# Patient Record
Sex: FEMALE | Race: WHITE | NOT HISPANIC OR LATINO | Employment: OTHER | URBAN - NONMETROPOLITAN AREA
[De-identification: names, ages, dates, MRNs, and addresses within clinical notes are randomized per-mention and may not be internally consistent; named-entity substitution may affect disease eponyms.]

---

## 2024-03-13 PROBLEM — E66.01 CLASS 2 SEVERE OBESITY WITH SERIOUS COMORBIDITY IN ADULT (MULTI): Status: ACTIVE | Noted: 2024-03-13

## 2024-03-13 PROBLEM — R00.2 PALPITATIONS: Status: ACTIVE | Noted: 2024-03-13

## 2024-03-13 PROBLEM — Z98.61 HISTORY OF PTCA: Status: ACTIVE | Noted: 2024-03-13

## 2024-03-13 PROBLEM — I25.10 ATHEROSCLEROSIS OF CORONARY ARTERY OF NATIVE HEART WITHOUT ANGINA PECTORIS: Status: ACTIVE | Noted: 2024-03-13

## 2024-03-13 PROBLEM — E66.812 CLASS 2 SEVERE OBESITY WITH SERIOUS COMORBIDITY IN ADULT: Status: ACTIVE | Noted: 2024-03-13

## 2024-03-13 PROBLEM — Z87.891 FORMER SMOKER: Status: ACTIVE | Noted: 2024-03-13

## 2024-03-13 RX ORDER — AMLODIPINE BESYLATE 10 MG/1
1 TABLET ORAL DAILY
COMMUNITY

## 2024-03-13 RX ORDER — ATORVASTATIN CALCIUM 40 MG/1
1 TABLET, FILM COATED ORAL NIGHTLY
COMMUNITY

## 2024-03-13 RX ORDER — ASPIRIN 81 MG/1
1 TABLET ORAL DAILY
COMMUNITY
Start: 2022-05-19

## 2024-03-13 RX ORDER — ESCITALOPRAM OXALATE 5 MG/1
1 TABLET ORAL DAILY
COMMUNITY

## 2024-03-13 RX ORDER — CALCIUM CARBONATE/VITAMIN D3 500-10/5ML
1 LIQUID (ML) ORAL DAILY
COMMUNITY
End: 2024-06-11 | Stop reason: ALTCHOICE

## 2024-03-13 RX ORDER — LEVOTHYROXINE SODIUM 88 UG/1
1 TABLET ORAL DAILY
COMMUNITY

## 2024-06-11 ENCOUNTER — OFFICE VISIT (OUTPATIENT)
Dept: CARDIOLOGY | Facility: CLINIC | Age: 69
End: 2024-06-11
Payer: MEDICARE

## 2024-06-11 VITALS
HEART RATE: 68 BPM | WEIGHT: 224.4 LBS | BODY MASS INDEX: 38.31 KG/M2 | SYSTOLIC BLOOD PRESSURE: 132 MMHG | HEIGHT: 64 IN | DIASTOLIC BLOOD PRESSURE: 82 MMHG

## 2024-06-11 DIAGNOSIS — I25.10 ATHEROSCLEROSIS OF NATIVE CORONARY ARTERY OF NATIVE HEART WITHOUT ANGINA PECTORIS: ICD-10-CM

## 2024-06-11 DIAGNOSIS — Z98.61 HISTORY OF PTCA: ICD-10-CM

## 2024-06-11 DIAGNOSIS — Z87.891 FORMER SMOKER: ICD-10-CM

## 2024-06-11 DIAGNOSIS — I10 ESSENTIAL HYPERTENSION: ICD-10-CM

## 2024-06-11 PROBLEM — E66.812 CLASS 2 SEVERE OBESITY WITH SERIOUS COMORBIDITY IN ADULT: Status: RESOLVED | Noted: 2024-03-13 | Resolved: 2024-06-11

## 2024-06-11 PROBLEM — E66.01 CLASS 2 SEVERE OBESITY WITH SERIOUS COMORBIDITY IN ADULT (MULTI): Status: RESOLVED | Noted: 2024-03-13 | Resolved: 2024-06-11

## 2024-06-11 PROCEDURE — 1036F TOBACCO NON-USER: CPT | Performed by: INTERNAL MEDICINE

## 2024-06-11 PROCEDURE — 3079F DIAST BP 80-89 MM HG: CPT | Performed by: INTERNAL MEDICINE

## 2024-06-11 PROCEDURE — 1159F MED LIST DOCD IN RCRD: CPT | Performed by: INTERNAL MEDICINE

## 2024-06-11 PROCEDURE — 99213 OFFICE O/P EST LOW 20 MIN: CPT | Performed by: INTERNAL MEDICINE

## 2024-06-11 PROCEDURE — 3075F SYST BP GE 130 - 139MM HG: CPT | Performed by: INTERNAL MEDICINE

## 2024-06-11 PROCEDURE — 3008F BODY MASS INDEX DOCD: CPT | Performed by: INTERNAL MEDICINE

## 2024-06-11 PROCEDURE — 1160F RVW MEDS BY RX/DR IN RCRD: CPT | Performed by: INTERNAL MEDICINE

## 2024-06-11 NOTE — LETTER
"June 11, 2024     Sanjuana Vega MD  [address] [facility] [address]    Patient: Jan Behrendsen   YOB: 1955   Date of Visit: 6/11/2024       Dear Dr. Sanjuana Vega MD:    Thank you for referring Jan Behrendsen to me for evaluation. Below are my notes for this consultation.  If you have questions, please do not hesitate to call me. I look forward to following your patient along with you.       Sincerely,     Leonardo Patino, DO      CC: No Recipients  ______________________________________________________________________________________    Subjective   Jan Behrendsen is a 69 y.o. female       Chief Complaint    Annual Exam          69-year-old female returns for follow-up she is doing very well, denies any cardiovascular events, complaints or nitrate usage or hospitalizations.  She is still walking 1-1/2 miles daily.  She has not had any substantial improvement in her obesity but remains aerobically active and asymptomatic and healthy.  She had normal stress testing in 2023, details of which have been reviewed.     She has known coronary disease with previous coronary intervention of the LAD with 1 drug-eluting stent in the RCA with 3 drug-eluting stents dating back to 2010. She is followed with Dr. Obando up until his recent intermediate    Her palpitations have resolved completely over the past year, last echo monitor was normal sinus rhythm without arrhythmia    Recommendations, continue current activity level and exercise and aerobic activity and dietary discretion, will follow-up in 1 year         Review of Systems   All other systems reviewed and are negative.           Vitals:    06/11/24 1017   BP: 132/82   BP Location: Right arm   Patient Position: Sitting   Pulse: 68   Weight: 102 kg (224 lb 6.4 oz)   Height: 1.626 m (5' 4\")        Objective   Physical Exam  Constitutional:       Appearance: Normal appearance.   HENT:      Nose: Nose normal. "   Neck:      Vascular: No carotid bruit.   Cardiovascular:      Rate and Rhythm: Normal rate.      Pulses: Normal pulses.      Heart sounds: Normal heart sounds.   Pulmonary:      Effort: Pulmonary effort is normal.   Abdominal:      General: Bowel sounds are normal.      Palpations: Abdomen is soft.   Musculoskeletal:         General: Normal range of motion.      Cervical back: Normal range of motion.      Right lower leg: No edema.      Left lower leg: No edema.   Skin:     General: Skin is warm and dry.   Neurological:      General: No focal deficit present.      Mental Status: She is alert.   Psychiatric:         Mood and Affect: Mood normal.         Behavior: Behavior normal.         Thought Content: Thought content normal.         Judgment: Judgment normal.         Allergies  Penicillins     Current Medications    Current Outpatient Medications:   •  amLODIPine (Norvasc) 10 mg tablet, Take 1 tablet (10 mg) by mouth once daily., Disp: , Rfl:   •  aspirin 81 mg EC tablet, Take 1 tablet (81 mg) by mouth once daily., Disp: , Rfl:   •  atorvastatin (Lipitor) 40 mg tablet, Take 1 tablet (40 mg) by mouth once daily at bedtime., Disp: , Rfl:   •  escitalopram (Lexapro) 5 mg tablet, Take 1 tablet (5 mg) by mouth once daily., Disp: , Rfl:   •  levothyroxine (Synthroid, Levoxyl) 88 mcg tablet, Take 1 tablet (88 mcg) by mouth once daily., Disp: , Rfl:                      Assessment/Plan   1. Atherosclerosis of native coronary artery of native heart without angina pectoris        2. History of PTCA        3. BMI 38.0-38.9,adult        4. Former smoker                 Scribe Attestation  By signing my name below, INataly LPN, Scribe   attest that this documentation has been prepared under the direction and in the presence of Leonardo Patino DO.     Provider Attestation - Scribe documentation    All medical record entries made by the Scribe were at my direction and personally dictated by me. I have reviewed the  chart and agree that the record accurately reflects my personal performance of the history, physical exam, discussion and plan.

## 2024-06-13 DIAGNOSIS — I10 ESSENTIAL HYPERTENSION: ICD-10-CM

## 2024-06-14 RX ORDER — AMLODIPINE BESYLATE 10 MG/1
10 TABLET ORAL DAILY
Qty: 90 TABLET | Refills: 3 | Status: SHIPPED | OUTPATIENT
Start: 2024-06-14

## 2024-07-08 DIAGNOSIS — I25.10 ATHEROSCLEROSIS OF NATIVE CORONARY ARTERY OF NATIVE HEART WITHOUT ANGINA PECTORIS: ICD-10-CM

## 2024-07-11 RX ORDER — ATORVASTATIN CALCIUM 40 MG/1
40 TABLET, FILM COATED ORAL NIGHTLY
Qty: 90 TABLET | Refills: 3 | Status: SHIPPED | OUTPATIENT
Start: 2024-07-11 | End: 2025-07-11

## 2025-04-13 DIAGNOSIS — I10 ESSENTIAL HYPERTENSION: ICD-10-CM

## 2025-04-15 RX ORDER — AMLODIPINE BESYLATE 10 MG/1
10 TABLET ORAL DAILY
Qty: 90 TABLET | Refills: 0 | Status: SHIPPED | OUTPATIENT
Start: 2025-04-15

## 2025-06-12 ENCOUNTER — APPOINTMENT (OUTPATIENT)
Dept: CARDIOLOGY | Facility: CLINIC | Age: 70
End: 2025-06-12
Payer: MEDICARE

## 2025-06-12 VITALS
WEIGHT: 223.4 LBS | SYSTOLIC BLOOD PRESSURE: 110 MMHG | DIASTOLIC BLOOD PRESSURE: 70 MMHG | HEIGHT: 64 IN | HEART RATE: 68 BPM | BODY MASS INDEX: 38.14 KG/M2

## 2025-06-12 DIAGNOSIS — E78.2 MIXED HYPERLIPIDEMIA: ICD-10-CM

## 2025-06-12 DIAGNOSIS — I25.10 ATHEROSCLEROSIS OF NATIVE CORONARY ARTERY OF NATIVE HEART WITHOUT ANGINA PECTORIS: ICD-10-CM

## 2025-06-12 DIAGNOSIS — I10 ESSENTIAL HYPERTENSION: ICD-10-CM

## 2025-06-12 DIAGNOSIS — Z87.891 FORMER SMOKER: ICD-10-CM

## 2025-06-12 DIAGNOSIS — Z98.61 HISTORY OF PTCA: ICD-10-CM

## 2025-06-12 PROCEDURE — 1036F TOBACCO NON-USER: CPT | Performed by: INTERNAL MEDICINE

## 2025-06-12 PROCEDURE — 3008F BODY MASS INDEX DOCD: CPT | Performed by: INTERNAL MEDICINE

## 2025-06-12 PROCEDURE — 3078F DIAST BP <80 MM HG: CPT | Performed by: INTERNAL MEDICINE

## 2025-06-12 PROCEDURE — 1160F RVW MEDS BY RX/DR IN RCRD: CPT | Performed by: INTERNAL MEDICINE

## 2025-06-12 PROCEDURE — 1159F MED LIST DOCD IN RCRD: CPT | Performed by: INTERNAL MEDICINE

## 2025-06-12 PROCEDURE — 3074F SYST BP LT 130 MM HG: CPT | Performed by: INTERNAL MEDICINE

## 2025-06-12 PROCEDURE — 99213 OFFICE O/P EST LOW 20 MIN: CPT | Performed by: INTERNAL MEDICINE

## 2025-06-12 RX ORDER — CLOBETASOL PROPIONATE 0.5 MG/G
CREAM TOPICAL AS NEEDED
COMMUNITY
Start: 2025-04-29

## 2025-06-12 RX ORDER — ATORVASTATIN CALCIUM 40 MG/1
40 TABLET, FILM COATED ORAL NIGHTLY
Qty: 90 TABLET | Refills: 3 | Status: SHIPPED | OUTPATIENT
Start: 2025-06-12 | End: 2026-06-12

## 2025-06-12 RX ORDER — AMLODIPINE BESYLATE 10 MG/1
10 TABLET ORAL DAILY
Qty: 90 TABLET | Refills: 0 | Status: SHIPPED | OUTPATIENT
Start: 2025-06-12

## 2025-06-12 NOTE — PROGRESS NOTES
"Chief Complaint   Patient presents with    Follow-up     Patient here for 1 year follow up for coronary artery disease, denies cardiac c/o at this time.       Subjective   Jan Behrendsen is a 70 y.o. female     70-year-old female returns for annual cardiovascular follow-up and doing extremely well.  She is still walking 1-1/2 miles daily 4-5 times a week, remains aerobically active both winter and summer months.  She denies any shortness of breath, angina or nitrate usage or hospitalizations.    She had normal stress testing in 2023, details of which have been reviewed. She has known coronary disease with previous coronary intervention of the LAD with 1 drug-eluting stent in the RCA with 3 drug-eluting stents dating back to 2010.    Recommendations: Obtain lipid panel, continue active healthy lifestyle, follow-up in 1 year           Review of Systems   All other systems reviewed and are negative.           Vitals:    06/12/25 1005   BP: 110/70   BP Location: Right arm   Patient Position: Sitting   Pulse: 68   Weight: 101 kg (223 lb 6.4 oz)   Height: 1.626 m (5' 4\")        Objective   Physical Exam  Constitutional:       Appearance: Normal appearance.   HENT:      Nose: Nose normal.   Neck:      Vascular: No carotid bruit.   Cardiovascular:      Rate and Rhythm: Normal rate.      Pulses: Normal pulses.      Heart sounds: Normal heart sounds.   Pulmonary:      Effort: Pulmonary effort is normal.   Abdominal:      General: Bowel sounds are normal.      Palpations: Abdomen is soft.   Musculoskeletal:         General: Normal range of motion.      Cervical back: Normal range of motion.      Right lower leg: No edema.      Left lower leg: No edema.   Skin:     General: Skin is warm and dry.   Neurological:      General: No focal deficit present.      Mental Status: She is alert.   Psychiatric:         Mood and Affect: Mood normal.         Behavior: Behavior normal.         Thought Content: Thought content normal.         " Judgment: Judgment normal.         Allergies  Penicillins     Current Medications  Current Outpatient Medications   Medication Instructions    amLODIPine (NORVASC) 10 mg, oral, Daily    aspirin 81 mg EC tablet 1 tablet, Daily    atorvastatin (LIPITOR) 40 mg, oral, Nightly    clobetasol (Temovate) 0.05 % cream As needed    escitalopram (Lexapro) 5 mg tablet 1 tablet, Daily    levothyroxine (Synthroid, Levoxyl) 88 mcg tablet 1 tablet, Daily                        Assessment/Plan   1. Atherosclerosis of native coronary artery of native heart without angina pectoris  Follow Up In Cardiology      2. History of PTCA        3. Essential hypertension        4. Mixed hyperlipidemia        5. Former smoker        6. BMI 38.0-38.9,adult                 Scribe Attestation  By signing my name below, I, Shagufta FELIX RN   , Scribe   attest that this documentation has been prepared under the direction and in the presence of Leonardo Patino DO.     Provider Attestation - Scribe documentation    All medical record entries made by the Scribe were at my direction and personally dictated by me. I have reviewed the chart and agree that the record accurately reflects my personal performance of the history, physical exam, discussion and plan.

## 2025-06-12 NOTE — LETTER
"June 12, 2025     Sanjuana Vega MD  [address] [facility] [address]    Patient: Jan Behrendsen   YOB: 1955   Date of Visit: 6/12/2025       Dear Dr. Sanjuana Vega MD:    Thank you for referring Jan Behrendsen to me for evaluation. Below are my notes for this consultation.  If you have questions, please do not hesitate to call me. I look forward to following your patient along with you.       Sincerely,     Leonardo Patino, DO      CC: No Recipients  ______________________________________________________________________________________    Chief Complaint   Patient presents with   • Follow-up     Patient here for 1 year follow up for coronary artery disease, denies cardiac c/o at this time.       Subjective   Jan Behrendsen is a 70 y.o. female     70-year-old female returns for annual cardiovascular follow-up and doing extremely well.  She is still walking 1-1/2 miles daily 4-5 times a week, remains aerobically active both winter and summer months.  She denies any shortness of breath, angina or nitrate usage or hospitalizations.    She had normal stress testing in 2023, details of which have been reviewed. She has known coronary disease with previous coronary intervention of the LAD with 1 drug-eluting stent in the RCA with 3 drug-eluting stents dating back to 2010.    Recommendations: Obtain lipid panel, continue active healthy lifestyle, follow-up in 1 year           Review of Systems   All other systems reviewed and are negative.           Vitals:    06/12/25 1005   BP: 110/70   BP Location: Right arm   Patient Position: Sitting   Pulse: 68   Weight: 101 kg (223 lb 6.4 oz)   Height: 1.626 m (5' 4\")        Objective   Physical Exam  Constitutional:       Appearance: Normal appearance.   HENT:      Nose: Nose normal.   Neck:      Vascular: No carotid bruit.   Cardiovascular:      Rate and Rhythm: Normal rate.      Pulses: Normal pulses.      Heart " sounds: Normal heart sounds.   Pulmonary:      Effort: Pulmonary effort is normal.   Abdominal:      General: Bowel sounds are normal.      Palpations: Abdomen is soft.   Musculoskeletal:         General: Normal range of motion.      Cervical back: Normal range of motion.      Right lower leg: No edema.      Left lower leg: No edema.   Skin:     General: Skin is warm and dry.   Neurological:      General: No focal deficit present.      Mental Status: She is alert.   Psychiatric:         Mood and Affect: Mood normal.         Behavior: Behavior normal.         Thought Content: Thought content normal.         Judgment: Judgment normal.         Allergies  Penicillins     Current Medications  Current Outpatient Medications   Medication Instructions   • amLODIPine (NORVASC) 10 mg, oral, Daily   • aspirin 81 mg EC tablet 1 tablet, Daily   • atorvastatin (LIPITOR) 40 mg, oral, Nightly   • clobetasol (Temovate) 0.05 % cream As needed   • escitalopram (Lexapro) 5 mg tablet 1 tablet, Daily   • levothyroxine (Synthroid, Levoxyl) 88 mcg tablet 1 tablet, Daily                        Assessment/Plan   1. Atherosclerosis of native coronary artery of native heart without angina pectoris  Follow Up In Cardiology      2. History of PTCA        3. Essential hypertension        4. Mixed hyperlipidemia        5. Former smoker        6. BMI 38.0-38.9,adult                 Scribe Attestation  By signing my name below, I, Shagufta FELIX RN   , Scribe   attest that this documentation has been prepared under the direction and in the presence of Leonardo Patino DO.     Provider Attestation - Scribe documentation    All medical record entries made by the Scribe were at my direction and personally dictated by me. I have reviewed the chart and agree that the record accurately reflects my personal performance of the history, physical exam, discussion and plan.

## 2025-06-12 NOTE — PATIENT INSTRUCTIONS
Please bring all medicines, vitamins, and herbal supplements with you when you come to the office.    Prescriptions will not be filled unless you are compliant with your follow up appointments or have a follow up appointment scheduled as per instruction of your physician. Refills should be requested at the time of your visit.     BMI was above normal measurement. Current weight: 101 kg (223 lb 6.4 oz)  Weight change since last visit (-) denotes wt loss -1 lbs   Weight loss needed to achieve BMI 25: 78.1 Lbs  Weight loss needed to achieve BMI 30: 49 Lbs  Provided instructions on dietary changes  Provided instructions on exercise.

## 2026-06-17 ENCOUNTER — APPOINTMENT (OUTPATIENT)
Dept: CARDIOLOGY | Facility: CLINIC | Age: 71
End: 2026-06-17
Payer: MEDICARE